# Patient Record
(demographics unavailable — no encounter records)

---

## 2025-02-12 NOTE — HISTORY OF PRESENT ILLNESS
[Post-hospitalization from ___ Hospital] : Post-hospitalization from [unfilled] Hospital [Admitted on: ___] : The patient was admitted on [unfilled] [Discharged on ___] : discharged on [unfilled] [FreeTextEntry2] : 49M with a PMH of PAD with R sided BKA and charcot joint of L foot s/p amputations of toes 3-5, DM, HTN, Bipolar disorder who presented to the ED for abdominal pain admitted. while inpatient charcot joint evaluated by surger. today pt feels well.

## 2025-02-12 NOTE — ASSESSMENT
[FreeTextEntry1] : Diabetes mellitus  - most recent a1c 10 - Patient notes that he takes metformin at times but doesn't take insulin - started on Lantus 35, Lispro 5 TID inpatient  PVD - asa qd  Abd pain  - CT A/P WNL - resolved inpatient  Bipolar - c/w abilify  HLD  - c/w atorvastatin  Charcot joint - f/u w surg  HTN - controlled off meds

## 2025-02-27 NOTE — PHYSICAL EXAM
[No Rash or Lesion] : No rash or lesion [Alert] : alert [Oriented to Person] : oriented to person [Oriented to Place] : oriented to place [Oriented to Time] : oriented to time [Calm] : calm [de-identified] : NAD [de-identified] : L charcot foot with scab on lateral side.  [de-identified] : posterior scalp incision clean, well healed, no further drainage or erythema

## 2025-02-27 NOTE — HISTORY OF PRESENT ILLNESS
[de-identified] : 48yo M with h/o dm, recently admitted at [facility] and underwent posterior scalp abscess incision and drainage (1/17). Reports it has healed well and denies current pain. No longer requiring wound care.

## 2025-02-27 NOTE — ASSESSMENT
[FreeTextEntry1] : - follow up as needed for posterior scalp  - continue to follow with podiatry for charcot foot